# Patient Record
Sex: MALE | Race: OTHER | NOT HISPANIC OR LATINO | URBAN - METROPOLITAN AREA
[De-identification: names, ages, dates, MRNs, and addresses within clinical notes are randomized per-mention and may not be internally consistent; named-entity substitution may affect disease eponyms.]

---

## 2018-06-24 ENCOUNTER — EMERGENCY (EMERGENCY)
Facility: HOSPITAL | Age: 68
LOS: 1 days | Discharge: HOME | End: 2018-06-24
Attending: EMERGENCY MEDICINE
Payer: MEDICARE

## 2018-06-24 VITALS
HEART RATE: 90 BPM | DIASTOLIC BLOOD PRESSURE: 90 MMHG | TEMPERATURE: 98 F | SYSTOLIC BLOOD PRESSURE: 146 MMHG | OXYGEN SATURATION: 100 % | RESPIRATION RATE: 18 BRPM

## 2018-06-24 VITALS
HEIGHT: 68 IN | TEMPERATURE: 98 F | DIASTOLIC BLOOD PRESSURE: 94 MMHG | HEART RATE: 98 BPM | WEIGHT: 162.92 LBS | OXYGEN SATURATION: 99 % | SYSTOLIC BLOOD PRESSURE: 192 MMHG | RESPIRATION RATE: 16 BRPM

## 2018-06-24 PROCEDURE — 99282 EMERGENCY DEPT VISIT SF MDM: CPT

## 2018-06-24 PROCEDURE — 96374 THER/PROPH/DIAG INJ IV PUSH: CPT

## 2018-06-24 PROCEDURE — 99284 EMERGENCY DEPT VISIT MOD MDM: CPT | Mod: 25

## 2018-06-24 RX ORDER — GABAPENTIN 400 MG/1
1 CAPSULE ORAL
Qty: 15 | Refills: 0 | OUTPATIENT
Start: 2018-06-24 | End: 2018-06-28

## 2018-06-24 RX ORDER — IBUPROFEN 200 MG
1 TABLET ORAL
Qty: 20 | Refills: 0 | OUTPATIENT
Start: 2018-06-24 | End: 2018-06-28

## 2018-06-24 RX ORDER — OXYCODONE AND ACETAMINOPHEN 5; 325 MG/1; MG/1
1 TABLET ORAL ONCE
Qty: 0 | Refills: 0 | Status: DISCONTINUED | OUTPATIENT
Start: 2018-06-24 | End: 2018-06-24

## 2018-06-24 RX ORDER — KETOROLAC TROMETHAMINE 30 MG/ML
30 SYRINGE (ML) INJECTION ONCE
Qty: 0 | Refills: 0 | Status: DISCONTINUED | OUTPATIENT
Start: 2018-06-24 | End: 2018-06-24

## 2018-06-24 RX ORDER — GABAPENTIN 400 MG/1
300 CAPSULE ORAL ONCE
Qty: 0 | Refills: 0 | Status: COMPLETED | OUTPATIENT
Start: 2018-06-24 | End: 2018-06-24

## 2018-06-24 RX ORDER — GABAPENTIN 400 MG/1
1 CAPSULE ORAL
Qty: 10 | Refills: 0 | OUTPATIENT
Start: 2018-06-24 | End: 2018-06-28

## 2018-06-24 RX ADMIN — Medication 30 MILLIGRAM(S): at 09:41

## 2018-06-24 RX ADMIN — OXYCODONE AND ACETAMINOPHEN 1 TABLET(S): 5; 325 TABLET ORAL at 09:41

## 2018-06-24 RX ADMIN — GABAPENTIN 300 MILLIGRAM(S): 400 CAPSULE ORAL at 09:41

## 2018-06-24 NOTE — ED PROVIDER NOTE - MEDICAL DECISION MAKING DETAILS
67 y/o M pt presents with R buttock pain, suspect exacerbation of underlying sciatica. Given no focal neuro findings and clinica Hx, will treat as such with neuro follow up.

## 2018-06-24 NOTE — ED ADULT NURSE NOTE - OBJECTIVE STATEMENT
Complaining of "pain on right buttock that radiates to right leg." AA&Ox4. Breathing on room air. No obvious deformity noted.

## 2018-06-24 NOTE — ED PROVIDER NOTE - OBJECTIVE STATEMENT
67 y/o M pt with PMHx of DM, HTN, and sciatica (dx 6 months ago) presents to ED c/o R buttock pain x 1 day, since this morning. Pt reports inability to ambulate but can with great difficulty. Pt states pain radiates down R posterior down to foot. Pt describe pain as shooting nerve-like pain, worsening with ambulation and improving with rest. Pt denies numbness but endorses paresthesia. Pt denies weakness of extremities, GI symptoms,  symptoms, chest pain, nausea, vomiting, shortness of breath, headache, or any other complaints.

## 2018-06-24 NOTE — ED PROVIDER NOTE - MUSCULOSKELETAL, MLM
Neck supple, no palpable tenderness, negative straight leg, 5/5 BLE strength, sensation grossly intact, reflex equal 2+ b/l,

## 2018-06-24 NOTE — ED PROVIDER NOTE - CHPI ED SYMPTOMS NEG
no numbness/no chest pain, no shortness of breath, no nausea, no vomiting, no headache, no GI symptoms, no  symptoms/no weakness

## 2021-03-02 NOTE — ED ADULT NURSE NOTE - NSHISCREENINGQ1_ED_A_ED
Patient left voice mail on nurse line requesting an appointment.  Attempted to contact patient but voice mail was not set up
No

## 2023-11-17 NOTE — ED PROVIDER NOTE - NS_ ATTENDINGSCRIBEDETAILS _ED_A_ED_FT
Statement Selected The scribe's documentation has been prepared under my direction and personally reviewed by me in its entirety. I confirm that the note above accurately reflects all work, treatment, procedures, and medical decision making performed by me.